# Patient Record
Sex: FEMALE | Race: WHITE | Employment: FULL TIME | URBAN - METROPOLITAN AREA
[De-identification: names, ages, dates, MRNs, and addresses within clinical notes are randomized per-mention and may not be internally consistent; named-entity substitution may affect disease eponyms.]

---

## 2020-12-14 ENCOUNTER — HOSPITAL ENCOUNTER (OUTPATIENT)
Age: 30
Discharge: HOME | End: 2020-12-14
Attending: FAMILY MEDICINE
Payer: COMMERCIAL

## 2020-12-14 VITALS
RESPIRATION RATE: 18 BRPM | HEIGHT: 68 IN | WEIGHT: 180 LBS | TEMPERATURE: 97 F | SYSTOLIC BLOOD PRESSURE: 143 MMHG | OXYGEN SATURATION: 100 % | HEART RATE: 50 BPM | DIASTOLIC BLOOD PRESSURE: 85 MMHG | BODY MASS INDEX: 27.28 KG/M2

## 2020-12-14 DIAGNOSIS — R53.83 FATIGUE, UNSPECIFIED TYPE: ICD-10-CM

## 2020-12-14 DIAGNOSIS — Z20.822 EXPOSURE TO COVID-19 VIRUS: ICD-10-CM

## 2020-12-14 DIAGNOSIS — Z20.822 ENCOUNTER FOR LABORATORY TESTING FOR COVID-19 VIRUS: Primary | ICD-10-CM

## 2020-12-14 PROCEDURE — 99202 OFFICE O/P NEW SF 15 MIN: CPT | Performed by: FAMILY MEDICINE

## 2020-12-14 NOTE — ED PROVIDER NOTES
Patient Seen in: [facility]      History   Patient presents with:  Fatigue    Stated Complaint: Test; exposed    HPI    Pt is a 32 yo with exposure to covid. Now feels tired and has some nasal congestion. No fevers or bodyaches.     Past Medic normal.      Breath sounds: Normal breath sounds. Skin:     General: Skin is warm. Capillary Refill: Capillary refill takes less than 2 seconds. Neurological:      General: No focal deficit present.       Mental Status: She is alert and oriented to

## 2021-01-11 ENCOUNTER — EKG ENCOUNTER (OUTPATIENT)
Dept: LAB | Age: 31
End: 2021-01-11
Attending: INTERNAL MEDICINE
Payer: COMMERCIAL

## 2021-01-11 ENCOUNTER — LAB ENCOUNTER (OUTPATIENT)
Dept: LAB | Age: 31
End: 2021-01-11
Attending: INTERNAL MEDICINE
Payer: COMMERCIAL

## 2021-01-11 ENCOUNTER — OFFICE VISIT (OUTPATIENT)
Dept: INTERNAL MEDICINE CLINIC | Facility: CLINIC | Age: 31
End: 2021-01-11
Payer: COMMERCIAL

## 2021-01-11 VITALS
HEART RATE: 69 BPM | HEIGHT: 68 IN | BODY MASS INDEX: 28.64 KG/M2 | SYSTOLIC BLOOD PRESSURE: 126 MMHG | DIASTOLIC BLOOD PRESSURE: 81 MMHG | WEIGHT: 189 LBS

## 2021-01-11 DIAGNOSIS — Z00.00 PHYSICAL EXAM: ICD-10-CM

## 2021-01-11 DIAGNOSIS — M54.41 CHRONIC MIDLINE LOW BACK PAIN WITH BILATERAL SCIATICA: ICD-10-CM

## 2021-01-11 DIAGNOSIS — F41.9 ANXIETY AND DEPRESSION: ICD-10-CM

## 2021-01-11 DIAGNOSIS — G89.29 CHRONIC MIDLINE LOW BACK PAIN WITH BILATERAL SCIATICA: ICD-10-CM

## 2021-01-11 DIAGNOSIS — F32.A ANXIETY AND DEPRESSION: ICD-10-CM

## 2021-01-11 DIAGNOSIS — Z00.00 PHYSICAL EXAM: Primary | ICD-10-CM

## 2021-01-11 DIAGNOSIS — M54.42 CHRONIC MIDLINE LOW BACK PAIN WITH BILATERAL SCIATICA: ICD-10-CM

## 2021-01-11 LAB
ALBUMIN SERPL-MCNC: 3.9 G/DL (ref 3.4–5)
ALBUMIN/GLOB SERPL: 1.1 {RATIO} (ref 1–2)
ALP LIVER SERPL-CCNC: 61 U/L
ALT SERPL-CCNC: 21 U/L
ANION GAP SERPL CALC-SCNC: 3 MMOL/L (ref 0–18)
AST SERPL-CCNC: 14 U/L (ref 15–37)
BASOPHILS # BLD AUTO: 0.06 X10(3) UL (ref 0–0.2)
BASOPHILS NFR BLD AUTO: 1.3 %
BILIRUB SERPL-MCNC: 0.4 MG/DL (ref 0.1–2)
BUN BLD-MCNC: 13 MG/DL (ref 7–18)
BUN/CREAT SERPL: 17.1 (ref 10–20)
CALCIUM BLD-MCNC: 9.6 MG/DL (ref 8.5–10.1)
CHLORIDE SERPL-SCNC: 106 MMOL/L (ref 98–112)
CHOLEST SMN-MCNC: 143 MG/DL (ref ?–200)
CO2 SERPL-SCNC: 29 MMOL/L (ref 21–32)
CREAT BLD-MCNC: 0.76 MG/DL
DEPRECATED RDW RBC AUTO: 45.3 FL (ref 35.1–46.3)
EOSINOPHIL # BLD AUTO: 0.17 X10(3) UL (ref 0–0.7)
EOSINOPHIL NFR BLD AUTO: 3.6 %
ERYTHROCYTE [DISTWIDTH] IN BLOOD BY AUTOMATED COUNT: 13 % (ref 11–15)
GLOBULIN PLAS-MCNC: 3.4 G/DL (ref 2.8–4.4)
GLUCOSE BLD-MCNC: 96 MG/DL (ref 70–99)
HCT VFR BLD AUTO: 41.2 %
HDLC SERPL-MCNC: 68 MG/DL (ref 40–59)
HGB BLD-MCNC: 13.2 G/DL
IMM GRANULOCYTES # BLD AUTO: 0 X10(3) UL (ref 0–1)
IMM GRANULOCYTES NFR BLD: 0 %
LDLC SERPL CALC-MCNC: 69 MG/DL (ref ?–100)
LYMPHOCYTES # BLD AUTO: 2.19 X10(3) UL (ref 1–4)
LYMPHOCYTES NFR BLD AUTO: 46.8 %
M PROTEIN MFR SERPL ELPH: 7.3 G/DL (ref 6.4–8.2)
MCH RBC QN AUTO: 30.2 PG (ref 26–34)
MCHC RBC AUTO-ENTMCNC: 32 G/DL (ref 31–37)
MCV RBC AUTO: 94.3 FL
MONOCYTES # BLD AUTO: 0.31 X10(3) UL (ref 0.1–1)
MONOCYTES NFR BLD AUTO: 6.6 %
NEUTROPHILS # BLD AUTO: 1.95 X10 (3) UL (ref 1.5–7.7)
NEUTROPHILS # BLD AUTO: 1.95 X10(3) UL (ref 1.5–7.7)
NEUTROPHILS NFR BLD AUTO: 41.7 %
NONHDLC SERPL-MCNC: 75 MG/DL (ref ?–130)
OSMOLALITY SERPL CALC.SUM OF ELEC: 286 MOSM/KG (ref 275–295)
PATIENT FASTING Y/N/NP: YES
PATIENT FASTING Y/N/NP: YES
PLATELET # BLD AUTO: 363 10(3)UL (ref 150–450)
POTASSIUM SERPL-SCNC: 4.2 MMOL/L (ref 3.5–5.1)
RBC # BLD AUTO: 4.37 X10(6)UL
SODIUM SERPL-SCNC: 138 MMOL/L (ref 136–145)
TRIGL SERPL-MCNC: 32 MG/DL (ref 30–149)
TSI SER-ACNC: 1.06 MIU/ML (ref 0.36–3.74)
VLDLC SERPL CALC-MCNC: 6 MG/DL (ref 0–30)
WBC # BLD AUTO: 4.7 X10(3) UL (ref 4–11)

## 2021-01-11 PROCEDURE — 80053 COMPREHEN METABOLIC PANEL: CPT

## 2021-01-11 PROCEDURE — 99385 PREV VISIT NEW AGE 18-39: CPT | Performed by: INTERNAL MEDICINE

## 2021-01-11 PROCEDURE — 3008F BODY MASS INDEX DOCD: CPT | Performed by: INTERNAL MEDICINE

## 2021-01-11 PROCEDURE — 36415 COLL VENOUS BLD VENIPUNCTURE: CPT

## 2021-01-11 PROCEDURE — 3079F DIAST BP 80-89 MM HG: CPT | Performed by: INTERNAL MEDICINE

## 2021-01-11 PROCEDURE — 84443 ASSAY THYROID STIM HORMONE: CPT

## 2021-01-11 PROCEDURE — 93005 ELECTROCARDIOGRAM TRACING: CPT

## 2021-01-11 PROCEDURE — 99213 OFFICE O/P EST LOW 20 MIN: CPT | Performed by: INTERNAL MEDICINE

## 2021-01-11 PROCEDURE — 93010 ELECTROCARDIOGRAM REPORT: CPT | Performed by: INTERNAL MEDICINE

## 2021-01-11 PROCEDURE — 85025 COMPLETE CBC W/AUTO DIFF WBC: CPT

## 2021-01-11 PROCEDURE — 3074F SYST BP LT 130 MM HG: CPT | Performed by: INTERNAL MEDICINE

## 2021-01-11 PROCEDURE — 80061 LIPID PANEL: CPT

## 2021-01-11 RX ORDER — ESCITALOPRAM OXALATE 20 MG/1
20 TABLET ORAL DAILY
Qty: 90 TABLET | Refills: 0 | Status: SHIPPED | OUTPATIENT
Start: 2021-01-11 | End: 2021-04-13

## 2021-01-11 RX ORDER — ESCITALOPRAM OXALATE 20 MG/1
20 TABLET ORAL DAILY
Qty: 90 TABLET | Refills: 0 | Status: SHIPPED | OUTPATIENT
Start: 2021-01-11 | End: 2021-01-11

## 2021-01-11 NOTE — PROGRESS NOTES
Adriana Moore is a 31year old female.   Patient presents with:  Establish Care      HPI:   NEW PT   C/c establish care -used to be seen at [facility]- dr Corinne Louder --pt moved here   C/o needs refills   Also c/o back issues -- gained wt x 5 yrs and denies heartburn, nausea or vomiting  : No Pain on urination, change in the color of urine, discharge, urinating frequently  MUS:+  back pain, joint pain, muscle pain  NEURO: denies headaches ,+  Anxiety,+  Depression stable     EXAM:   /81   P care everywhere for [facility]   Pap - has apt next mn with dr Hartman [place] to see dr Reagan Rocha with [address]. today she is fasting  Flu shot - declined           The patient indicates understanding of these issues and agrees to the plan.   No follo

## 2021-01-14 ENCOUNTER — PATIENT MESSAGE (OUTPATIENT)
Dept: INTERNAL MEDICINE CLINIC | Facility: CLINIC | Age: 31
End: 2021-01-14

## 2021-01-14 DIAGNOSIS — R00.1 SINUS BRADYCARDIA: Primary | ICD-10-CM

## 2021-01-14 NOTE — TELEPHONE ENCOUNTER
From: April Darshana Walker  To:  Junior Mckeon MD  Sent: 1/14/2021 9:28 AM CST  Subject: Test Results Question    Hi Dr. Hilary Akhtar,    I received my test results fro the EKG through my chart and saw the following:    \"Marked sinus Bradycardia  Low voltage in pre

## 2021-01-15 ENCOUNTER — NURSE TRIAGE (OUTPATIENT)
Dept: INTERNAL MEDICINE CLINIC | Facility: CLINIC | Age: 31
End: 2021-01-15

## 2021-01-15 DIAGNOSIS — G89.29 CHRONIC MIDLINE LOW BACK PAIN WITH RIGHT-SIDED SCIATICA: Primary | ICD-10-CM

## 2021-01-15 DIAGNOSIS — M54.41 CHRONIC MIDLINE LOW BACK PAIN WITH RIGHT-SIDED SCIATICA: Primary | ICD-10-CM

## 2021-01-15 NOTE — TELEPHONE ENCOUNTER
Please ask you to try ibuprofen 400 mg twice a day to meals and she can try topical agents such as BenGay and Aspercreme  Can try Aspercreme patches as well  Ordered MRI --discussed with her at visit that  might not be approve it but will order and see if

## 2021-01-18 ENCOUNTER — OFFICE VISIT (OUTPATIENT)
Dept: PHYSICAL THERAPY | Age: 31
End: 2021-01-18
Attending: INTERNAL MEDICINE
Payer: COMMERCIAL

## 2021-01-18 ENCOUNTER — TELEPHONE (OUTPATIENT)
Dept: CARDIOLOGY CLINIC | Facility: CLINIC | Age: 31
End: 2021-01-18

## 2021-01-18 ENCOUNTER — HOSPITAL ENCOUNTER (OUTPATIENT)
Dept: CV DIAGNOSTICS | Facility: HOSPITAL | Age: 31
Discharge: HOME | End: 2021-01-18
Attending: INTERNAL MEDICINE
Payer: COMMERCIAL

## 2021-01-18 DIAGNOSIS — M54.42 CHRONIC MIDLINE LOW BACK PAIN WITH BILATERAL SCIATICA: ICD-10-CM

## 2021-01-18 DIAGNOSIS — G89.29 CHRONIC MIDLINE LOW BACK PAIN WITH BILATERAL SCIATICA: ICD-10-CM

## 2021-01-18 DIAGNOSIS — R00.1 SINUS BRADYCARDIA: ICD-10-CM

## 2021-01-18 DIAGNOSIS — M54.41 CHRONIC MIDLINE LOW BACK PAIN WITH BILATERAL SCIATICA: ICD-10-CM

## 2021-01-18 PROCEDURE — 93227 XTRNL ECG REC<48 HR R&I: CPT | Performed by: INTERNAL MEDICINE

## 2021-01-18 PROCEDURE — 97161 PT EVAL LOW COMPLEX 20 MIN: CPT | Performed by: PHYSICAL THERAPIST

## 2021-01-18 PROCEDURE — 97110 THERAPEUTIC EXERCISES: CPT | Performed by: PHYSICAL THERAPIST

## 2021-01-18 PROCEDURE — 97530 THERAPEUTIC ACTIVITIES: CPT | Performed by: PHYSICAL THERAPIST

## 2021-01-18 PROCEDURE — 93225 XTRNL ECG REC<48 HRS REC: CPT | Performed by: INTERNAL MEDICINE

## 2021-01-18 NOTE — PROGRESS NOTES
SPINE EVALUATION:   Referring Physician: Dr. Neil Calderon  Diagnosis:   Chronic midline low back pain with bilateral sciatica (M54.41,M54.42,G89.29) Date of Service: 1/18/2021     PATIENT SUMMARY   Adriana Oliver is a 31year old female who presents to thera LBP with radiation in R LE. The results of the objective tests and measures show pain with lumbar spine AROM, pain/weakness with LE MMT, poor seated posture, c/o upon palpation general low back.   Functional deficits include but are not limited to: standing ergonomics, breaks from sitting; bodymechanics for lifting.  Avoid high impact exercise at this time and possibly cycling   Patient was instructed in and issued a HEP for: prone press up or standing lumbar extension, pone hip ext, cat cow, LTR    Charges: P need for these services furnished under this plan of treatment and while under my care.     X___________________________________________________ Date____________________    Certification From: 2/52/0662  To:4/18/2021

## 2021-01-18 NOTE — TELEPHONE ENCOUNTER
Pt stopped by office dropped of report of xr lumbar from [facility] along with disc placed in dr Bai Pall mail box

## 2021-01-18 NOTE — TELEPHONE ENCOUNTER
Danielle was read by pt on 1/16/21 at 9:55 AM.   MRI with prior auth status as \"OPEN\". [facility] please inform pt when this is approved so she can book the appointment.

## 2021-01-19 ENCOUNTER — MED REC SCAN ONLY (OUTPATIENT)
Dept: INTERNAL MEDICINE CLINIC | Facility: CLINIC | Age: 31
End: 2021-01-19

## 2021-01-20 NOTE — TELEPHONE ENCOUNTER
Received x-ray–will scan  Noted that the impression shows mild arthritic changes no evidence of any fracture    Findings consistent with mild straightening of the normal lordosis  Mild loss of disc space height most marked at the L5-S1 level  Small endplat

## 2021-01-21 ENCOUNTER — PATIENT MESSAGE (OUTPATIENT)
Dept: INTERNAL MEDICINE CLINIC | Facility: CLINIC | Age: 31
End: 2021-01-21

## 2021-01-21 NOTE — TELEPHONE ENCOUNTER
From: April Anette Notice  To: Mara Augustine MD  Sent: 1/21/2021 4:16 PM CST  Subject: Test Results Question    Dr. Pamella Solares,    Is there anything i need to do following my heart monitor results?      April

## 2021-01-25 ENCOUNTER — TELEPHONE (OUTPATIENT)
Dept: PHYSICAL THERAPY | Facility: HOSPITAL | Age: 31
End: 2021-01-25

## 2021-01-25 ENCOUNTER — APPOINTMENT (OUTPATIENT)
Dept: PHYSICAL THERAPY | Age: 31
End: 2021-01-25
Attending: INTERNAL MEDICINE
Payer: COMMERCIAL

## 2021-01-29 ENCOUNTER — APPOINTMENT (OUTPATIENT)
Dept: PHYSICAL THERAPY | Age: 31
End: 2021-01-29
Attending: INTERNAL MEDICINE
Payer: COMMERCIAL

## 2021-01-29 ENCOUNTER — TELEPHONE (OUTPATIENT)
Dept: PHYSICAL THERAPY | Facility: HOSPITAL | Age: 31
End: 2021-01-29

## 2021-02-01 ENCOUNTER — LAB ENCOUNTER (OUTPATIENT)
Dept: LAB | Facility: HOSPITAL | Age: 31
End: 2021-02-01
Attending: PHYSICIAN ASSISTANT
Payer: COMMERCIAL

## 2021-02-01 ENCOUNTER — TELEPHONE (OUTPATIENT)
Dept: PHYSICAL THERAPY | Facility: HOSPITAL | Age: 31
End: 2021-02-01

## 2021-02-01 ENCOUNTER — VIRTUAL PHONE E/M (OUTPATIENT)
Dept: INTERNAL MEDICINE CLINIC | Facility: CLINIC | Age: 31
End: 2021-02-01
Payer: COMMERCIAL

## 2021-02-01 ENCOUNTER — NURSE TRIAGE (OUTPATIENT)
Dept: INTERNAL MEDICINE CLINIC | Facility: CLINIC | Age: 31
End: 2021-02-01

## 2021-02-01 ENCOUNTER — APPOINTMENT (OUTPATIENT)
Dept: PHYSICAL THERAPY | Age: 31
End: 2021-02-01
Attending: INTERNAL MEDICINE
Payer: COMMERCIAL

## 2021-02-01 DIAGNOSIS — Z20.822 SUSPECTED COVID-19 VIRUS INFECTION: ICD-10-CM

## 2021-02-01 DIAGNOSIS — R50.9 FEVER, UNSPECIFIED FEVER CAUSE: Primary | ICD-10-CM

## 2021-02-01 DIAGNOSIS — R50.9 FEVER, UNSPECIFIED FEVER CAUSE: ICD-10-CM

## 2021-02-01 DIAGNOSIS — R43.0 LOSS OF SMELL: ICD-10-CM

## 2021-02-01 PROCEDURE — 99213 OFFICE O/P EST LOW 20 MIN: CPT | Performed by: PHYSICIAN ASSISTANT

## 2021-02-01 NOTE — TELEPHONE ENCOUNTER
Action Requested: Summary for Provider     []  Critical Lab, Recommendations Needed  [] Need Additional Advice  []   FYI    []   Need Orders  [] Need Medications Sent to Pharmacy  []  Other     SUMMARY: Virtual appt scheduled today with IDANIA UPTON.  Pt is

## 2021-02-01 NOTE — PROGRESS NOTES
Telephone Check-In    April Zac Mitchell verbally consents to a Virtual/Telephone Check-In service on 02/01/21. Patient understands and accepts financial responsibility for any deductible, co-insurance and/or co-pays associated with this service.     Christian

## 2021-02-02 ENCOUNTER — MED REC SCAN ONLY (OUTPATIENT)
Dept: INTERNAL MEDICINE CLINIC | Facility: CLINIC | Age: 31
End: 2021-02-02

## 2021-02-02 LAB — SARS-COV-2 RNA RESP QL NAA+PROBE: DETECTED

## 2021-02-05 ENCOUNTER — APPOINTMENT (OUTPATIENT)
Dept: PHYSICAL THERAPY | Age: 31
End: 2021-02-05
Attending: INTERNAL MEDICINE
Payer: COMMERCIAL

## 2021-02-05 ENCOUNTER — TELEPHONE (OUTPATIENT)
Dept: PHYSICAL THERAPY | Age: 31
End: 2021-02-05

## 2021-02-08 ENCOUNTER — APPOINTMENT (OUTPATIENT)
Dept: PHYSICAL THERAPY | Age: 31
End: 2021-02-08
Attending: INTERNAL MEDICINE
Payer: COMMERCIAL

## 2021-02-11 ENCOUNTER — TELEPHONE (OUTPATIENT)
Dept: PHYSICAL THERAPY | Age: 31
End: 2021-02-11

## 2021-02-12 ENCOUNTER — APPOINTMENT (OUTPATIENT)
Dept: PHYSICAL THERAPY | Age: 31
End: 2021-02-12
Attending: INTERNAL MEDICINE
Payer: COMMERCIAL

## 2021-02-15 ENCOUNTER — APPOINTMENT (OUTPATIENT)
Dept: PHYSICAL THERAPY | Age: 31
End: 2021-02-15
Attending: INTERNAL MEDICINE
Payer: COMMERCIAL

## 2021-02-17 ENCOUNTER — OFFICE VISIT (OUTPATIENT)
Dept: OBGYN CLINIC | Facility: CLINIC | Age: 31
End: 2021-02-17
Payer: COMMERCIAL

## 2021-02-17 VITALS
HEIGHT: 68 IN | SYSTOLIC BLOOD PRESSURE: 132 MMHG | WEIGHT: 185 LBS | HEART RATE: 67 BPM | BODY MASS INDEX: 28.04 KG/M2 | DIASTOLIC BLOOD PRESSURE: 92 MMHG

## 2021-02-17 DIAGNOSIS — Z01.419 ENCOUNTER FOR GYNECOLOGICAL EXAMINATION WITHOUT ABNORMAL FINDING: Primary | ICD-10-CM

## 2021-02-17 DIAGNOSIS — Z12.4 CERVICAL CANCER SCREENING: ICD-10-CM

## 2021-02-17 DIAGNOSIS — Z31.69 ENCOUNTER FOR PRECONCEPTION CONSULTATION: ICD-10-CM

## 2021-02-17 PROCEDURE — 99385 PREV VISIT NEW AGE 18-39: CPT | Performed by: OBSTETRICS & GYNECOLOGY

## 2021-02-17 PROCEDURE — 3008F BODY MASS INDEX DOCD: CPT | Performed by: OBSTETRICS & GYNECOLOGY

## 2021-02-17 PROCEDURE — 3080F DIAST BP >= 90 MM HG: CPT | Performed by: OBSTETRICS & GYNECOLOGY

## 2021-02-17 PROCEDURE — 3075F SYST BP GE 130 - 139MM HG: CPT | Performed by: OBSTETRICS & GYNECOLOGY

## 2021-02-17 NOTE — PROGRESS NOTES
Well Woman Exam    HPI:  The patient is a 26yo female who presents today for annual exam. She moved to [facility] from [facility]. She is thinking of conceiving later this year. Regular menses. She has history of abnormal pap smear in 2019.      Reviewed file        Attends meetings of clubs or organizations: Not on file        Relationship status: Not on file      Intimate partner violence        Fear of current or ex partner: Not on file        Emotionally abused: Not on file        Physically abused: No Genitalia: normal appearance, hair distribution, and no lesions  Urethral Meatus:  normal in size, location, without lesions and prolapse  Bladder:  No fullness, masses or tenderness  Vagina:  Normal appearance without lesions, no abnormal discharge  [place]

## 2021-02-18 LAB — HPV I/H RISK 1 DNA SPEC QL NAA+PROBE: NEGATIVE

## 2021-02-19 ENCOUNTER — TELEPHONE (OUTPATIENT)
Dept: PHYSICAL THERAPY | Facility: HOSPITAL | Age: 31
End: 2021-02-19

## 2021-02-19 ENCOUNTER — APPOINTMENT (OUTPATIENT)
Dept: PHYSICAL THERAPY | Age: 31
End: 2021-02-19
Attending: INTERNAL MEDICINE
Payer: COMMERCIAL

## 2021-02-19 ENCOUNTER — TELEPHONE (OUTPATIENT)
Dept: PHYSICAL THERAPY | Age: 31
End: 2021-02-19

## 2021-02-19 NOTE — TELEPHONE ENCOUNTER
Left patient voicemail re: have a few questions prior to her attending PT this afternoon at 2:30pm. Requested that she call PT prior to appointment at .      Intention of call was to make sure that patient appropriate/safe to attend to attend PT

## 2021-03-03 ENCOUNTER — TELEPHONE (OUTPATIENT)
Dept: PHYSICAL THERAPY | Age: 31
End: 2021-03-03

## 2021-03-03 ENCOUNTER — APPOINTMENT (OUTPATIENT)
Dept: PHYSICAL THERAPY | Age: 31
End: 2021-03-03
Attending: INTERNAL MEDICINE
Payer: COMMERCIAL

## 2021-03-03 ENCOUNTER — TELEPHONE (OUTPATIENT)
Dept: PHYSICAL THERAPY | Facility: HOSPITAL | Age: 31
End: 2021-03-03

## 2021-03-03 NOTE — TELEPHONE ENCOUNTER
Left patient voicemail re: have a few questions prior to her attending PT today at 4:30pm. Requested that she call PT prior to appointment at .      Intention of call was to make sure that patient appropriate/safe to attend to attend PT given +

## 2021-03-30 ENCOUNTER — TELEPHONE (OUTPATIENT)
Dept: INTERNAL MEDICINE CLINIC | Facility: CLINIC | Age: 31
End: 2021-03-30

## 2021-03-30 ENCOUNTER — HOSPITAL ENCOUNTER (OUTPATIENT)
Dept: MRI IMAGING | Facility: HOSPITAL | Age: 31
Discharge: HOME | End: 2021-03-30
Attending: INTERNAL MEDICINE
Payer: COMMERCIAL

## 2021-03-30 DIAGNOSIS — G89.29 CHRONIC MIDLINE LOW BACK PAIN WITH RIGHT-SIDED SCIATICA: ICD-10-CM

## 2021-03-30 DIAGNOSIS — M54.41 CHRONIC MIDLINE LOW BACK PAIN WITH RIGHT-SIDED SCIATICA: ICD-10-CM

## 2021-03-30 PROCEDURE — 72148 MRI LUMBAR SPINE W/O DYE: CPT | Performed by: INTERNAL MEDICINE

## 2021-03-30 NOTE — TELEPHONE ENCOUNTER
Patient calling reports saw her MRI results on MyChart     Explained to patient her PCP will review the results and make recommendations and pt will be notified of such    Patient verbalizes understanding

## 2021-04-05 ENCOUNTER — TELEPHONE (OUTPATIENT)
Dept: INTERNAL MEDICINE CLINIC | Facility: CLINIC | Age: 31
End: 2021-04-05

## 2021-04-05 NOTE — TELEPHONE ENCOUNTER
Patient calling regarding MRI results done on 3/29/21. Patient concerned about results and would like Dr. Goldie Dejesus recommendations.      Dr. Mary Olivier:   Please review results and advise

## 2021-04-13 ENCOUNTER — OFFICE VISIT (OUTPATIENT)
Dept: ORTHOPEDICS CLINIC | Facility: CLINIC | Age: 31
End: 2021-04-13
Payer: COMMERCIAL

## 2021-04-13 VITALS — HEIGHT: 68 IN | BODY MASS INDEX: 27.28 KG/M2 | WEIGHT: 180 LBS

## 2021-04-13 DIAGNOSIS — F32.A ANXIETY AND DEPRESSION: ICD-10-CM

## 2021-04-13 DIAGNOSIS — M54.16 RIGHT LUMBAR RADICULOPATHY: ICD-10-CM

## 2021-04-13 DIAGNOSIS — F41.9 ANXIETY AND DEPRESSION: ICD-10-CM

## 2021-04-13 DIAGNOSIS — M51.26 LUMBAR HERNIATED DISC: Primary | ICD-10-CM

## 2021-04-13 PROCEDURE — 3008F BODY MASS INDEX DOCD: CPT | Performed by: ORTHOPAEDIC SURGERY

## 2021-04-13 PROCEDURE — 99244 OFF/OP CNSLTJ NEW/EST MOD 40: CPT | Performed by: ORTHOPAEDIC SURGERY

## 2021-04-13 NOTE — H&P
5 [id number] INTAKE COMMENTS: Patient presents with:  Consult: patient injured her back 2 yrs ago doing yoga ,then hurt playing golf ,right lumbar pain can reach a 7/10 ,right hip ,pain and numbness down the right leg ,feels stiffness in te cervicalspine /n Relation Age of Onset   • Hypertension Father    • Heart Disorder Mother         pacemaker    • Hypertension Maternal Grandmother      No family Hx of DVT/PE    Social History    Occupational History      Not on file    Tobacco Use      Smoking status: For straight leg raise bilaterally. No atrophy. Vascular function was good distally. Imaging: X-rays from an outside institution were suboptimal.  MRI lumbar spine recently shows normal spine except for the herniated disc as in history of present illness. right lateral and subarticular recesses. There is mass effect with slight displacement  of the descending right S1 nerve root. There is a posterior annular disc fissure. There is disc desiccation.          CONCLUSION:   Transitional lumbar anatomy with yesica

## 2021-04-14 RX ORDER — ESCITALOPRAM OXALATE 20 MG/1
20 TABLET ORAL DAILY
Qty: 90 TABLET | Refills: 0 | Status: SHIPPED | OUTPATIENT
Start: 2021-04-14 | End: 2021-09-28

## 2021-06-09 ENCOUNTER — OFFICE VISIT (OUTPATIENT)
Dept: PAIN CLINIC | Facility: HOSPITAL | Age: 31
End: 2021-06-09
Attending: ORTHOPAEDIC SURGERY
Payer: COMMERCIAL

## 2021-06-09 VITALS
WEIGHT: 180 LBS | SYSTOLIC BLOOD PRESSURE: 132 MMHG | RESPIRATION RATE: 18 BRPM | HEART RATE: 56 BPM | BODY MASS INDEX: 28.93 KG/M2 | DIASTOLIC BLOOD PRESSURE: 87 MMHG | HEIGHT: 66 IN

## 2021-06-09 DIAGNOSIS — M51.26 LUMBAR HERNIATED DISC: ICD-10-CM

## 2021-06-09 DIAGNOSIS — M54.16 RIGHT LUMBAR RADICULOPATHY: ICD-10-CM

## 2021-06-09 PROCEDURE — 99202 OFFICE O/P NEW SF 15 MIN: CPT

## 2021-06-09 RX ORDER — METHYLPREDNISOLONE 4 MG/1
TABLET ORAL
Qty: 21 TABLET | Refills: 0 | Status: SHIPPED | OUTPATIENT
Start: 2021-06-09 | End: 2021-09-29 | Stop reason: ALTCHOICE

## 2021-06-09 NOTE — PROGRESS NOTES
6/9/2021-presents ambulatory to [facility] to establish care;  NEW CONSULT C/O LBP INTO THE BILAT. HIPS AND DOWN THE BILAT. THIGHS;  Pt states this is  due to her lifestyle-works out, yoga and plays golf;   Has been going on for > one year;  Rates her pain 3/10;

## 2021-06-09 NOTE — CHRONIC PAIN
Initial Consultation Note      HISTORY OF PRESENT ILLNESS:  Adriana Sierra is a 31year old old female referred to the pain clinic  for evaluation treatment of her low back pain hip is a very nice 78-year-old white female who hurt her back about a year a Hypertension Maternal Grandmother        SOCIAL HISTORY:  Social History    Socioeconomic History      Marital status:       Spouse name: Not on file      Number of children: Not on file      Years of education: Not on file      Highest education le CTAB  Gait: Normal; cane user - No  Spine: Normal    ROM:   Lumbar spine  Flexion  full  Extension full  Cervical Spine  Flexion   Extension  MOTOR EXAMINATION:  UPPER EXTREMITY      LEFT RIGHT   Deltoid 5/5 5/5   Biceps 5/5 5/5   Triceps 5/5 5/5   Brachio steroid injection   treatment options were discussed with April she wants to go ahead and pursue these 2 treatment plans   she is also requesting MAC anesthesia secondary to anxiety  Comprehensive analgesic plan was formulated.  Conservative vs. Aggressive

## 2021-06-14 ENCOUNTER — TELEPHONE (OUTPATIENT)
Dept: INTERNAL MEDICINE CLINIC | Facility: CLINIC | Age: 31
End: 2021-06-14

## 2021-06-14 NOTE — TELEPHONE ENCOUNTER
Patient presented to office requesting B12 injection, informed patient no B12 lab result or order on chart. Patient requesting lab order for the following labs, routed to MA to advise and place orders.        B12  Folate   Vitamin D  Iron Level  ACTH plasma

## 2021-06-15 NOTE — TELEPHONE ENCOUNTER
CSS, please assist patient with scheduling an appointment with Dr. Pamella Solares to discuss lab orders.

## 2021-06-15 NOTE — TELEPHONE ENCOUNTER
1st attempt/left voice mail message for pt to call back. When the patient returns the call please inform her of the message below and help assist in scheduling an appointment.

## 2021-06-17 ENCOUNTER — TELEPHONE (OUTPATIENT)
Dept: PAIN CLINIC | Facility: HOSPITAL | Age: 31
End: 2021-06-17

## 2021-09-16 ENCOUNTER — DOCUMENTATION ONLY (OUTPATIENT)
Dept: PAIN CLINIC | Facility: HOSPITAL | Age: 31
End: 2021-09-16

## 2021-09-16 NOTE — PROGRESS NOTES
Procedure code [address] BanksMaddock--- Called at 11:48 am on 9//9/21 at # [phone]  Spoke with Gabriela patel, case has been approved  auth # [id number]  Valid from 09/21/2021---10/04/2021

## 2021-09-28 DIAGNOSIS — F32.A ANXIETY AND DEPRESSION: ICD-10-CM

## 2021-09-28 DIAGNOSIS — F41.9 ANXIETY AND DEPRESSION: ICD-10-CM

## 2021-09-28 RX ORDER — ESCITALOPRAM OXALATE 20 MG/1
20 TABLET ORAL DAILY
Qty: 90 TABLET | Refills: 0 | Status: SHIPPED | OUTPATIENT
Start: 2021-09-28 | End: 2021-10-19

## 2021-09-28 NOTE — TELEPHONE ENCOUNTER
Rx faxed 90 days per protocol.        Future Appointments   Date Time Provider Ericka Hodgson   9/29/2021  9:40 AM Misael Blair MD [facility]       Refill passed per [facility] protocol   Requested Prescriptions   Pending Prescriptions Cristianmagaly Monge

## 2021-09-29 ENCOUNTER — TELEMEDICINE (OUTPATIENT)
Dept: INTERNAL MEDICINE CLINIC | Facility: CLINIC | Age: 31
End: 2021-09-29

## 2021-09-29 DIAGNOSIS — R41.840 POOR CONCENTRATION: Primary | ICD-10-CM

## 2021-09-29 DIAGNOSIS — F41.9 ANXIETY AND DEPRESSION: ICD-10-CM

## 2021-09-29 DIAGNOSIS — F32.A ANXIETY AND DEPRESSION: ICD-10-CM

## 2021-09-29 PROCEDURE — 99213 OFFICE O/P EST LOW 20 MIN: CPT | Performed by: INTERNAL MEDICINE

## 2021-09-29 RX ORDER — LORAZEPAM 1 MG/1
1 TABLET ORAL DAILY
Qty: 30 TABLET | Refills: 0 | Status: SHIPPED | OUTPATIENT
Start: 2021-09-29 | End: 2021-10-19

## 2021-09-29 NOTE — PROGRESS NOTES
Patient ID: Adriana Hardy is a 31year old female. Patient presents with: Follow - Up         HISTORY OF PRESENT ILLNESS:   Patient presents for above. This visit is conducted using Telemedicine with live, interactive video and audio.   C/c poor myra status: Former Smoker      Smokeless tobacco: Never Used      Tobacco comment: social in college     Vaping Use      Vaping Use: Never used    Substance and Sexual Activity      Alcohol use: Yes        Comment: weekly       Drug use: Never      Sexual acti Poor concentration  -  NAVIGATOR  - OP REFERRAL TO PSYCHIATRY    2. Anxiety and depression  - LORazepam 1 MG Oral Tab; Take 1 tablet (1 mg total) by mouth daily. Dispense: 30 tablet;  Refill: 0  Refilled her medications, will refer to [facility] Included in this visit, time may have been spent reviewing labs, medications, radiology tests and decision making. Appropriate medical decision-making and tests are ordered as detailed in the plan of care above.   Coding/billing information is submitted f

## 2021-10-19 PROBLEM — F90.0 ATTENTION DEFICIT HYPERACTIVITY DISORDER (ADHD), PREDOMINANTLY INATTENTIVE TYPE: Status: ACTIVE | Noted: 2021-10-19

## 2021-10-25 ENCOUNTER — TELEMEDICINE (OUTPATIENT)
Dept: INTERNAL MEDICINE CLINIC | Facility: CLINIC | Age: 31
End: 2021-10-25

## 2021-10-25 DIAGNOSIS — R06.83 SNORES: Primary | ICD-10-CM

## 2021-10-25 DIAGNOSIS — R06.81 APNEA SPELL: ICD-10-CM

## 2021-10-25 DIAGNOSIS — R41.840 POOR CONCENTRATION: ICD-10-CM

## 2021-10-25 PROCEDURE — 99213 OFFICE O/P EST LOW 20 MIN: CPT | Performed by: INTERNAL MEDICINE

## 2021-10-25 NOTE — PROGRESS NOTES
Patient ID: Adriana Saenz is a 27year old female. Patient presents with:  Concentration         HISTORY OF PRESENT ILLNESS:   Patient presents for above. This visit is conducted using Telemedicine with live, interactive video and audio.   C/c sherry con Use      Vaping Use: Never used    Substance and Sexual Activity      Alcohol use: Yes        Comment: weekly       Drug use: Never      Sexual activity: Not on file    Other Topics      Concerns:        Not on file    Social History Narrative      Not on concentration  - OP [facility] ALT REFERRAL DIAGOSTIC SLEEP STUDY ADULT  - GENERAL SLEEP STUDY TRANSCRIPTION; Future    3. Apnea spell  - OP [facility] ALT REFERRAL DIAGOSTIC SLEEP STUDY ADULT  - GENERAL SLEEP STUDY TRANSCRIPTION;  Future  Ordered sleep study and follow- tests and decision making. Appropriate medical decision-making and tests are ordered as detailed in the plan of care above. Coding/billing information is submitted for this visit based on complexity of care and/or time spent for the visit.     This note wa

## 2021-10-29 ENCOUNTER — IMMUNIZATION (OUTPATIENT)
Dept: LAB | Facility: HOSPITAL | Age: 31
End: 2021-10-29
Attending: EMERGENCY MEDICINE
Payer: COMMERCIAL

## 2021-10-29 DIAGNOSIS — Z23 NEED FOR VACCINATION: Primary | ICD-10-CM

## 2021-10-29 PROCEDURE — 0001A SARSCOV2 VAC 30MCG/0.3ML IM: CPT

## 2021-11-15 NOTE — ED INITIAL ASSESSMENT (HPI)
Brought by [facility] after being found intoxicated and laying down in the elevator of her building . A neighbor called EMS. She drank numerous mimosas between 11-4pm.   She was AOx3 on EMS arrival, vitals WNL. Denies pain.

## 2021-11-15 NOTE — ED PROVIDER NOTES
Patient Seen in: [facility] AND [facility] Emergency Department      History   Patient presents with:  Alcohol Intoxication    Stated Complaint:     Subjective:   HPI    50-year-old female with anxiety and depression history reportedly was drinking today per hus rhythm and intact distal pulses. Pulmonary/Chest: Effort normal. No respiratory distress. Abdominal: Soft. There is no tenderness. There is no guarding. Musculoskeletal: Normal range of motion. No edema or tenderness.    Neurological: Alert and orien symptoms.                              Disposition and Plan     Clinical Impression:  Alcoholic intoxication without complication ([facility] [address]  (primary encounter diagnosis)     Disposition:  Discharge  11/14/2021  7:42 pm    Follow-up:  Edil Banda MD  [address]

## 2021-11-19 ENCOUNTER — IMMUNIZATION (OUTPATIENT)
Dept: LAB | Facility: HOSPITAL | Age: 31
End: 2021-11-19
Attending: EMERGENCY MEDICINE
Payer: COMMERCIAL

## 2021-11-19 DIAGNOSIS — Z23 NEED FOR VACCINATION: Primary | ICD-10-CM

## 2021-11-19 PROCEDURE — 0002A SARSCOV2 VAC 30MCG/0.3ML IM: CPT

## 2022-03-25 NOTE — TELEPHONE ENCOUNTER
Pt stated she mentioned at [address] about lower back pain, woke up this AM with significant pain lower back area radiating down right buttock and leg, left hip , stated had xray last year  Has PT scheduled for Monday but in too much pain, asking for further adv yes

## (undated) NOTE — MR AVS SNAPSHOT
After Visit Summary   2/17/2021 April Valery Burks    MRN: [id number]           Visit Information     Date & Time  2/17/2021  3:20 PM Provider  Sarthak Nguyen MD [address] [place] [address], [address] [place] [address],[address], [facility].  Phone If you receive a survey from [employer], please take a few minutes to complete it and provide feedback. We strive to deliver the best patient experience and are looking for ways to make improvements. Your feedback will help us do so.  For more information EMERGENCY ROOM Life-threatening emergencies needing immediate intervention at a hospital emergency room.  Average cost  $2,300*   *Cost varies based on your insurance coverage  For more information about hours, locations or appointment options available at